# Patient Record
Sex: FEMALE | Race: WHITE | NOT HISPANIC OR LATINO | URBAN - METROPOLITAN AREA
[De-identification: names, ages, dates, MRNs, and addresses within clinical notes are randomized per-mention and may not be internally consistent; named-entity substitution may affect disease eponyms.]

---

## 2018-11-23 ENCOUNTER — OFFICE VISIT - HEALTHEAST (OUTPATIENT)
Dept: FAMILY MEDICINE | Facility: CLINIC | Age: 23
End: 2018-11-23

## 2018-11-23 DIAGNOSIS — T19.2XXA RETAINED TAMPON, INITIAL ENCOUNTER: ICD-10-CM
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
## 2018-12-05 ENCOUNTER — OFFICE VISIT - HEALTHEAST (OUTPATIENT)
Dept: FAMILY MEDICINE | Facility: CLINIC | Age: 23
End: 2018-12-05

## 2018-12-05 DIAGNOSIS — B96.89 BACTERIAL VAGINOSIS: ICD-10-CM

## 2018-12-05 DIAGNOSIS — R35.0 FREQUENT URINATION: ICD-10-CM

## 2018-12-05 DIAGNOSIS — N89.8 VAGINAL DISCHARGE: ICD-10-CM

## 2018-12-05 DIAGNOSIS — R10.2 PELVIC PAIN IN FEMALE: ICD-10-CM

## 2018-12-05 DIAGNOSIS — N76.0 BACTERIAL VAGINOSIS: ICD-10-CM

## 2018-12-05 LAB
ALBUMIN UR-MCNC: ABNORMAL MG/DL
APPEARANCE UR: CLEAR
BACTERIA #/AREA URNS HPF: ABNORMAL HPF
BILIRUB UR QL STRIP: ABNORMAL
CLUE CELLS: NORMAL
COLOR UR AUTO: YELLOW
GLUCOSE UR STRIP-MCNC: NEGATIVE MG/DL
HCG UR QL: NEGATIVE
HGB UR QL STRIP: NEGATIVE
KETONES UR STRIP-MCNC: ABNORMAL MG/DL
LEUKOCYTE ESTERASE UR QL STRIP: NEGATIVE
MUCOUS THREADS #/AREA URNS LPF: ABNORMAL LPF
NITRATE UR QL: NEGATIVE
PH UR STRIP: 7 [PH] (ref 5–8)
RBC #/AREA URNS AUTO: ABNORMAL HPF
SP GR UR STRIP: 1.02 (ref 1–1.03)
SP GR UR STRIP: 1.02 (ref 1–1.03)
SQUAMOUS #/AREA URNS AUTO: ABNORMAL LPF
TRANS CELLS #/AREA URNS HPF: ABNORMAL LPF
TRICHOMONAS, WET PREP: NORMAL
UROBILINOGEN UR STRIP-ACNC: ABNORMAL
WBC #/AREA URNS AUTO: ABNORMAL HPF
YEAST, WET PREP: NORMAL

## 2018-12-06 LAB
BACTERIA SPEC CULT: NO GROWTH
C TRACH DNA SPEC QL PROBE+SIG AMP: NEGATIVE
N GONORRHOEA DNA SPEC QL NAA+PROBE: NEGATIVE

## 2018-12-12 ENCOUNTER — COMMUNICATION - HEALTHEAST (OUTPATIENT)
Dept: FAMILY MEDICINE | Facility: CLINIC | Age: 23
End: 2018-12-12

## 2018-12-13 ENCOUNTER — COMMUNICATION - HEALTHEAST (OUTPATIENT)
Dept: SCHEDULING | Facility: CLINIC | Age: 23
End: 2018-12-13

## 2019-09-21 ENCOUNTER — OFFICE VISIT - HEALTHEAST (OUTPATIENT)
Dept: FAMILY MEDICINE | Facility: CLINIC | Age: 24
End: 2019-09-21

## 2019-09-21 DIAGNOSIS — N89.8 VAGINAL DISCHARGE: ICD-10-CM

## 2019-09-21 DIAGNOSIS — R07.0 THROAT PAIN: ICD-10-CM

## 2019-09-21 DIAGNOSIS — B96.89 BV (BACTERIAL VAGINOSIS): ICD-10-CM

## 2019-09-21 DIAGNOSIS — N76.0 BV (BACTERIAL VAGINOSIS): ICD-10-CM

## 2019-09-21 LAB
CLUE CELLS: NORMAL
KOH PREPARATION: NORMAL
TRICHOMONAS, WET PREP: NORMAL
YEAST, WET PREP: NORMAL

## 2021-05-31 ENCOUNTER — RECORDS - HEALTHEAST (OUTPATIENT)
Dept: ADMINISTRATIVE | Facility: CLINIC | Age: 26
End: 2021-05-31

## 2021-06-02 VITALS — WEIGHT: 170.1 LBS

## 2021-06-02 VITALS — WEIGHT: 168.9 LBS

## 2021-06-03 VITALS
WEIGHT: 162.9 LBS | RESPIRATION RATE: 12 BRPM | HEART RATE: 71 BPM | SYSTOLIC BLOOD PRESSURE: 113 MMHG | DIASTOLIC BLOOD PRESSURE: 76 MMHG | OXYGEN SATURATION: 97 % | TEMPERATURE: 98.8 F

## 2021-06-17 NOTE — PATIENT INSTRUCTIONS - HE
Patient Instructions by Shaista Martin MD at 2019  1:10 PM     Author: Shaista Martin MD Service: -- Author Type: Physician    Filed: 2019  3:08 PM Encounter Date: 2019 Status: Addendum    : Shaista Martin MD (Physician)    Related Notes: Original Note by Shaista Martin MD (Physician) filed at 2019  3:08 PM       1. If follow up is needed, you can be seen by any primary care provider at one of our [facility] sites   2. If you have any questions, call the clinic number - it's answered     Patient Education     Bacterial Vaginosis    You have a vaginal infection called bacterial vaginosis (BV). Both good and bad bacteria are present in a healthy vagina. BV occurs when these bacteria get out of balance. The number of bad bacteria increase. And the number of good bacteria decrease. Although BV is associated with sexual activity, it is not a sexually transmitted disease.  BV may or may not cause symptoms. If symptoms do occur, they can include:    Thin, gray, milky-white, or sometimes green discharge    Unpleasant odor or fishy smell    Itching, burning, or pain in or around the vagina  It is not known what causes BV, but certain factors can make the problem more likely. This can include:    Douching    Having sex with a new partner    Having sex with more than one partner  BV will sometimes go away on its own. But treatment is usually recommended. This is because untreated BV can increase the risk of more serious health problems such as:    Pelvic inflammatory disease (PID)     delivery (giving birth to a baby early if youre pregnant)    HIV and certain other sexually transmitted diseases (STDs)    Infection after surgery on the reproductive organs  Home care  General care    BV is most often treated with medicines called antibiotics. These may be given as pills or as a vaginal cream. If antibiotics are prescribed, be sure to use them exactly as directed. Also, be sure to  complete all of the medicine, even if your symptoms go away.    Don't douche or having sex during treatment.    If you have sex with a female partner, ask your healthcare provider if she should also be treated.  Prevention    Don't douche.    Don't have sex. If you do have sex, then take steps to lower your risk:  ? Use condoms when having sex.  ? Limit the number of sexual partners you have.  Follow-up care  Follow up with your healthcare provider, or as advised.  When to seek medical advice  Call your healthcare provider right away if:    You have a fever of 100.4 F (38 C) or higher, or as directed by your provider.    Your symptoms worsen, or they dont go away within a few days of starting treatment.    You have new pain in the lower belly or pelvic region.    You have side effects that bother you or a reaction to the pills or cream youre prescribed.    You or any partners you have sex with have new symptoms, such as a rash, joint pain, or sores.  Date Last Reviewed: 10/1/2017    4572-4038 [employer]. [address] [place] [address]. All rights reserved. This information is not intended as a substitute for professional medical care. Always follow your healthcare professional's instructions.           Patient Education     Vaginal Infection: Understanding the Vaginal Environment  The vagina is a canal. It connects the uterus (womb) to the outside of the body. It is home to many types of bacteria and other tiny organisms. These different bacteria most often stay balanced in number. This keeps the vagina healthy. If the balance changes, it can cause infection.   A healthy environment  Many types of bacteria are present in a healthy vagina. When balanced, they dont cause problems. Small amounts of yeast may also be present without causing problems. The most common type of bacteria in the vagina is lactobacillus. It helps keep the vagina at a low pH. A low pH keeps bad bacteria from taking  over.  Normal vaginal discharge  The vagina makes fluid. It is sent out as discharge. This also keeps the vagina healthy. Normal discharge can be clear, white, or yellowish. Most women find that normal discharge varies in amount and color through the month.  An unhealthy environment  The vaginal environment may get out of balance. This may result in a vaginal infection. There are a few reasons this can happen. The pH may have changed. The amount of one organism, such as yeast, may increase. Or an outside organism may get into the vagina and throw off the balance:    Bacterial vaginosis (BV). BV is due to an imbalance in the normal bacteria in the vagina. Lactobacillus bacteria decrease. As a result, the numbers of bad bacteria increase.    Candidiasis (yeast infection). Yeast is a type of fungus. A yeast infection occurs when yeast cells in the vagina increase. They then attack vaginal tissues. A type of yeast called Candida albicans is often involved.    Trichomoniasis (trich). Trich is a parasite. It is passed from one person to another during sex. Men with trich often dont have any symptoms. In women, it can take weeks or months before symptoms appear.  Date Last Reviewed: 3/1/2017    9272-8139 [facility]. [address] [place] [address], [place] [address]. All rights reserved. This information is not intended as a substitute for professional medical care. Always follow your healthcare professional's instructions.

## 2021-06-21 NOTE — PROGRESS NOTES
WALK IN CARE - VISIT NOTE    HPI    24 yo female presenting for evaluation of:    1. Retained tampon  - less than 24 hours, patient placed earlier today and is unable to extract it  - no fevers/chills/sweats  - no light headedness/dizziness  - no unusual/malodorous discharge    ROS  Negative except as noted in HPI    OBJECTIVE    Vitals  Vitals:    11/23/18 1448   BP: 106/70   Pulse: 62   Resp: 16   Temp: 98.3  F (36.8  C)   SpO2: 98%           Physical Exam  General: No acute distress  Eyes: EOMI, PERRLA, normal conjunctiva  CV: RRR, distal and peripheral pulses in tact  Resp: CTA bilaterally, no wheezing, no rhonchi, no rhales, no respiratory distress  Abdomen: soft, non-tender, normal bowel sounds  : no lesions noted, normal physiologic discharge, mild bleeding associated with menstrual cycle noted, tampon visualized     Labs  N/A      ASSESSMENT/PLAN      # Retained tampon  - visualized and removed  - discussed symptoms with which to go ER

## 2021-06-26 NOTE — PROGRESS NOTES
Progress Notes by Saurav Rodriguez DO at 12/5/2018  3:20 PM     Author: Saurav Rodriguez DO Service: -- Author Type: Physician    Filed: 12/6/2018  9:07 AM Encounter Date: 12/5/2018 Status: Signed    : Saurav Rodriguez DO (Physician)       Chief Complaint   Patient presents with   ? Abdominal Pain     pressure in the lower abdominal area x 2-3 weeks   ? Vaginal Discharge     x 2-3 weeks   ? Urinary Frequency     x 2-3 weeks with little output        History of Present Illness: Rooming staff notes reviewed.  Chief concerns of patient are lower pelvic pain, and increased vaginal discharge.  Patient has had daily pain in pelvic area, alternating from right to left. She has had increased clear vaginal discharge for the past 2-3 weeks. She still has her appendix. No vaginal itching noted. Her last menstrual period was about 2 weeks ago.  Patient has a prior history of bacterial vaginosis.  When I discussed possible treatment for bacterial vaginosis, and the risks of drinking alcohol while taking metronidazole, patient admitted that she drinks about 3-4 alcoholic beverages most nights.     Review of systems: See history of present illness, otherwise negative.     Current Outpatient Medications   Medication Sig Dispense Refill   ? clindamycin (CLEOCIN) 300 MG capsule Take 1 capsule (300 mg total) by mouth 2 (two) times a day for 7 days. 14 capsule 0     No current facility-administered medications for this visit.      No past medical history on file.   No past surgical history on file.   Social History     Tobacco Use   ? Smoking status: Current Every Day Smoker     Types: Cigarettes   ? Smokeless tobacco: Never Used   Substance Use Topics   ? Alcohol use: Not on file   ? Drug use: Not on file        No family history on file.    Vitals:    12/05/18 1541   BP: 114/80   Patient Site: Right Arm   Patient Position: Sitting   Cuff Size: Adult Regular   Pulse: 72   Resp: 16   Temp: 98.8  F (37.1  C)   TempSrc: Oral   SpO2:  98%   Weight: 168 lb 14.4 oz (76.6 kg)       EXAM:   General: Vital signs reviewed.  Patient is in no acute appearing distress.  Breathing appears nonlabored.  Patient is alert and oriented ×3.  Heart: Heart rate is regular without murmur.  Lungs: Lungs are clear to auscultation with good airflow bilaterally.    Abdomen soft, there is tenderness in the suprapubic region, no abnormal masses or organomegally. Normal bowel sounds.  Skin: Warm and dry.  Vaginal and pelvic exam were done with female support staff to chaperone and assist with equipment.  The exam did not note any outer genital abnormality.  Vaginal exam with speculum showed increased whitish clear vaginal discharge, with a very small amount of blood-tinged mucus-like discharge from cervix.  The appearance of cervix was otherwise normal.    Recent Results (from the past 24 hour(s))   Urinalysis-UC if Indicated   Result Value Ref Range    Color, UA Yellow Colorless, Yellow, Straw, Light Yellow    Clarity, UA Clear Clear    Glucose, UA Negative Negative    Bilirubin, UA Small (!) Negative    Ketones, UA 15 mg/dL (!) Negative    Specific Gravity, UA 1.025 1.005 - 1.030    Blood, UA Negative Negative    pH, UA 7.0 5.0 - 8.0    Protein, UA Trace (!) Negative mg/dL    Urobilinogen, UA 2.0 E.U./dL (!) 0.2 E.U./dL, 1.0 E.U./dL    Nitrite, UA Negative Negative    Leukocytes, UA Negative Negative    Bacteria, UA Many (!) None Seen hpf    RBC, UA 3-5 (!) None Seen, 0-2 hpf    WBC, UA 0-5 None Seen, 0-5 hpf    Squam Epithel, UA 10-25 (!) None Seen, 0-5 lpf    Trans Epithel, UA 0-5 (!) None Seen lpf    Mucus, UA Many (!) None Seen lpf   Wet Prep, Vaginal   Result Value Ref Range    Yeast Result No yeast seen No yeast seen    Trichomonas No Trichomonas seen No Trichomonas seen    Clue Cells, Wet Prep No Clue cells seen No Clue cells seen   Pregnancy (Beta-hCG, Qual), Urine   Result Value Ref Range    Pregnancy Test, Urine Negative Negative    Specific Gravity, UA 1.025  1.001 - 1.030   Results from exam reviewed with patient.  I advised patient that the urine was more concentrated, which can lead to a small amount of bilirubin being noted in the urine study when also considering the small amount of blood in the urine which can increase this.  The protein reflects the increased urine concentration and blood also.  The negative pregnancy test was reviewed with patient that exam.  The wet prep study was normal and reviewed at time of exam.  Patient did not want to have any blood studies done for STD screening.  I advised her to eventually get screened for hepatitis, syphilis, and HIV.    Assessment/Plan   1. Frequent urination  Urinalysis-UC if Indicated    Culture, Urine   2. Pelvic pain in female  Wet Prep, Vaginal    Chlamydia trachomatis & Neisseria gonorrhoeae, Amplified Detection    Pregnancy (Beta-hCG, Qual), Urine   3. Vaginal discharge  Wet Prep, Vaginal    Chlamydia trachomatis & Neisseria gonorrhoeae, Amplified Detection   4. Bacterial vaginosis         Patient Instructions     We will notify you of the urine culture results when available, and treat appropriately. Some of your urinary urgency can be relate to your alcohol consumption. I am treating your for bacterial vaginosis based on your symptoms and exam findings more then the lab study today. We will notify you of the results of the STD test studies not known at time of exam, and treat appropriately.      Patient Education     Vaginal Infection: Bacterial Vaginosis  Both good and bad bacteria are present in a healthy vagina. Bacterial vaginosis (BV) occurs when these bacteria get out of balance. The numbers of good bacteria decrease. This allows the numbers of bad bacteria to increase and cause BV. In most cases, BV is not a serious problem.  Causes of bacterial vaginosis  The cause of BV is not clear. Douching may lead to it. Having sex with a new partner or more than 1 partner makes it more likely.  Symptoms of  bacterial vaginosis  Symptoms of BV vary for each woman. Some women have few symptoms or none at all. If symptoms are present, they can include:    Thin, milky white or gray or sometimes green discharge    Unpleasant fishy odor    Irritation, itching, and burning at opening of vagina which may indicate mixed vaginitis      Burning or irritation with sex or urination which may indicate mixed vaginitis  Diagnosing bacterial vaginosis  Your healthcare provider will ask about your symptoms and health history. He or she will also do a pelvic exam. This is an exam of your vagina and cervix. A sample of vaginal fluid or discharge may be taken. This sample is checked for signs of BV.  Treating bacterial vaginosis  BV is often treated with antibiotics. They may be given in oral pill form or as a vaginal cream. To use these medicines:    Be sure to take all of your medicine, even if your symptoms go away.    If youre taking antibiotic pills, do not drink alcohol until youre finished with all of your medicine.    If youre using vaginal cream, apply it as directed. Be aware that the cream may make condoms and diaphragms less effective.    Call your healthcare provider if symptoms do not go away within 4 days of starting treatment. Also call if you have a reaction to the medicine.  Why treatment matters  Even if you have no symptoms or your symptoms are mild, BV should be treated. Untreated BV can lead to health problems such as:    Increased risk of  delivery if youre pregnant    Increased risk of complications after surgery on the reproductive organs    Possible increased risk of pelvic inflammatory disease (PID)   Date Last Reviewed: 3/1/2017    2942-3717 [employer]. [address] [place] [address], [place] [address]. All rights reserved. This information is not intended as a substitute for professional medical care. Always follow your healthcare professional's instructions.              Saurav Rodriguez, DO

## 2021-06-28 NOTE — PROGRESS NOTES
Progress Notes by Shaista Martin MD at 9/21/2019  1:10 PM     Author: Shaista Martin MD Service: -- Author Type: Physician    Filed: 9/21/2019  5:04 PM Encounter Date: 9/21/2019 Status: Signed    : Shaista Martin MD (Physician)         Subjective:   Mary Reyes is a 24 y.o. female  Roomed by: cristina FRANKLIN       Chief Complaint   Patient presents with   ? Vaginitis     vaginal odors, itching- possibly in mouth too    Denies any recent urinary frequency, but denies any urgency, dysuria, fever, chills or flank pain.  Admits vaginal discharge for 2-3 days. Currently not using any hormonal contraception, but admit using condoms. Admits a sore throat with bumps on the back of her tongue.     Review of Systems  See HPI for ROS, otherwise balance of other systems negative    Allergies   Allergen Reactions   ? Amoxicillin Other (See Comments)     Vomiting per patient     No current outpatient medications on file.  There is no problem list on file for this patient.    No past medical history on file. - if none on file, see Problem List    Objective:     Vitals:    09/21/19 1342   BP: 113/76   Pulse: 71   Resp: 12   Temp: 98.8  F (37.1  C)   TempSrc: Oral   SpO2: 97%   Weight: 162 lb 14.4 oz (73.9 kg)   Gen - Pt in NAD   Exam:  External Genitalia - no masses or ulcerations, slight erythema without any induration  Vagina - small amount of white creamy discharge    Results for orders placed or performed in visit on 09/21/19   KOH Prep   Result Value Ref Range    KOH Prep No Yeast or Fungal Elements Seen No Yeast or Fungal Elements Seen   Wet Prep, Vaginal   Result Value Ref Range    Yeast Result No yeast seen No yeast seen    Trichomonas No Trichomonas seen No Trichomonas seen    Clue Cells, Wet Prep No Clue cells seen No Clue cells seen   Lab result discussed on day of visit.     Assessment - Plan   Medical Decision Making -24-year-old woman presents with vaginal itching and odor.  Also was wondering if  she could have yeast in her mouth secondary to oral sex.  On exam she is afebrile and vital signs are stable.  She does have a creamy white discharge.  Wet prep was negative.  KOH was negative.  Because of patient's symptoms of an odor and also she did have a vaginal discharge that was more characteristic of a BV, will treat with oral clindamycin twice daily x7 days.  Patient is to follow-up with primary if her symptoms are not improved over the next week.  See patient instructions.    1. BV (bacterial vaginosis)  - clindamycin (CLEOCIN) 300 MG capsule; Take 1 capsule (300 mg total) by mouth 2 times a day at 6:00 am and 4:00 pm for 7 days. Take with either yogurt or probiotics  Dispense: 14 capsule; Refill: 0    2. Throat pain  - KOH Prep    3. Vaginal discharge  - Wet Prep, Vaginal    At the conclusion of the encounter, assessment and plan were discussed.   All questions were answered.   The patient or guardian acknowledged understanding and was involved in the decision making regarding the overall care plan.    Patient Instructions   1. If follow up is needed, you can be seen by any primary care provider at one of our [facility] sites   2. If you have any questions, call the clinic number - it's answered 24/7    Patient Education     Bacterial Vaginosis    You have a vaginal infection called bacterial vaginosis (BV). Both good and bad bacteria are present in a healthy vagina. BV occurs when these bacteria get out of balance. The number of bad bacteria increase. And the number of good bacteria decrease. Although BV is associated with sexual activity, it is not a sexually transmitted disease.  BV may or may not cause symptoms. If symptoms do occur, they can include:    Thin, gray, milky-white, or sometimes green discharge    Unpleasant odor or fishy smell    Itching, burning, or pain in or around the vagina  It is not known what causes BV, but certain factors can make the problem more likely. This can  include:    Douching    Having sex with a new partner    Having sex with more than one partner  BV will sometimes go away on its own. But treatment is usually recommended. This is because untreated BV can increase the risk of more serious health problems such as:    Pelvic inflammatory disease (PID)     delivery (giving birth to a baby early if youre pregnant)    HIV and certain other sexually transmitted diseases (STDs)    Infection after surgery on the reproductive organs  Home care  General care    BV is most often treated with medicines called antibiotics. These may be given as pills or as a vaginal cream. If antibiotics are prescribed, be sure to use them exactly as directed. Also, be sure to complete all of the medicine, even if your symptoms go away.    Don't douche or having sex during treatment.    If you have sex with a female partner, ask your healthcare provider if she should also be treated.  Prevention    Don't douche.    Don't have sex. If you do have sex, then take steps to lower your risk:  ? Use condoms when having sex.  ? Limit the number of sexual partners you have.  Follow-up care  Follow up with your healthcare provider, or as advised.  When to seek medical advice  Call your healthcare provider right away if:    You have a fever of 100.4 F (38 C) or higher, or as directed by your provider.    Your symptoms worsen, or they dont go away within a few days of starting treatment.    You have new pain in the lower belly or pelvic region.    You have side effects that bother you or a reaction to the pills or cream youre prescribed.    You or any partners you have sex with have new symptoms, such as a rash, joint pain, or sores.  Date Last Reviewed: 10/1/2017    2088-6269 [employer]. [address] [place] [address]. All rights reserved. This information is not intended as a substitute for professional medical care. Always follow your healthcare professional's  instructions.           Patient Education     Vaginal Infection: Understanding the Vaginal Environment  The vagina is a canal. It connects the uterus (womb) to the outside of the body. It is home to many types of bacteria and other tiny organisms. These different bacteria most often stay balanced in number. This keeps the vagina healthy. If the balance changes, it can cause infection.   A healthy environment  Many types of bacteria are present in a healthy vagina. When balanced, they dont cause problems. Small amounts of yeast may also be present without causing problems. The most common type of bacteria in the vagina is lactobacillus. It helps keep the vagina at a low pH. A low pH keeps bad bacteria from taking over.  Normal vaginal discharge  The vagina makes fluid. It is sent out as discharge. This also keeps the vagina healthy. Normal discharge can be clear, white, or yellowish. Most women find that normal discharge varies in amount and color through the month.  An unhealthy environment  The vaginal environment may get out of balance. This may result in a vaginal infection. There are a few reasons this can happen. The pH may have changed. The amount of one organism, such as yeast, may increase. Or an outside organism may get into the vagina and throw off the balance:    Bacterial vaginosis (BV). BV is due to an imbalance in the normal bacteria in the vagina. Lactobacillus bacteria decrease. As a result, the numbers of bad bacteria increase.    Candidiasis (yeast infection). Yeast is a type of fungus. A yeast infection occurs when yeast cells in the vagina increase. They then attack vaginal tissues. A type of yeast called Candida albicans is often involved.    Trichomoniasis (trich). Trich is a parasite. It is passed from one person to another during sex. Men with trich often dont have any symptoms. In women, it can take weeks or months before symptoms appear.  Date Last Reviewed: 3/1/2017    9466-7364 [employer] [address] [place] [address], [place] [address]. All rights reserved. This information is not intended as a substitute for professional medical care. Always follow your healthcare professional's instructions.